# Patient Record
Sex: MALE | Race: WHITE | Employment: FULL TIME | URBAN - METROPOLITAN AREA
[De-identification: names, ages, dates, MRNs, and addresses within clinical notes are randomized per-mention and may not be internally consistent; named-entity substitution may affect disease eponyms.]

---

## 2022-12-05 ENCOUNTER — HOSPITAL ENCOUNTER (EMERGENCY)
Age: 32
Discharge: HOME | End: 2022-12-05
Payer: COMMERCIAL

## 2022-12-05 ENCOUNTER — APPOINTMENT (OUTPATIENT)
Dept: GENERAL RADIOLOGY | Age: 32
End: 2022-12-05
Payer: COMMERCIAL

## 2022-12-05 VITALS
OXYGEN SATURATION: 100 % | RESPIRATION RATE: 15 BRPM | DIASTOLIC BLOOD PRESSURE: 98 MMHG | HEART RATE: 81 BPM | SYSTOLIC BLOOD PRESSURE: 134 MMHG | TEMPERATURE: 98.1 F

## 2022-12-05 DIAGNOSIS — K63.9 SPLENIC FLEXURE SYNDROME: ICD-10-CM

## 2022-12-05 DIAGNOSIS — K59.00 CONSTIPATION, UNSPECIFIED CONSTIPATION TYPE: ICD-10-CM

## 2022-12-05 DIAGNOSIS — R10.12 LEFT UPPER QUADRANT ABDOMINAL PAIN: Primary | ICD-10-CM

## 2022-12-05 PROCEDURE — 99284 EMERGENCY DEPT VISIT MOD MDM: CPT

## 2022-12-05 PROCEDURE — 71045 X-RAY EXAM CHEST 1 VIEW: CPT

## 2022-12-05 PROCEDURE — 93005 ELECTROCARDIOGRAM TRACING: CPT | Performed by: EMERGENCY MEDICINE

## 2022-12-05 PROCEDURE — 74018 RADEX ABDOMEN 1 VIEW: CPT

## 2022-12-05 ASSESSMENT — PAIN - FUNCTIONAL ASSESSMENT: PAIN_FUNCTIONAL_ASSESSMENT: NONE - DENIES PAIN

## 2022-12-05 NOTE — ED PROVIDER NOTES
[address] [facility]  ED  EMERGENCY DEPARTMENT ENCOUNTER        Pt Name: Fernanda Saleem  MRN: [id number]  [place] 1990  Date of evaluation: 12/5/2022  Provider: Shiv Childs PA-C  PCP: No primary care provider on file. Note Started: 6:54 PM EST 12/5/22          ROBYN. I have evaluated this patient. My supervising physician was available for consultation. CHIEF COMPLAINT       Chief Complaint   Patient presents with    Abdominal Pain     For \"a couple months\" worst when he crunches        HISTORY OF PRESENT ILLNESS   (Location, Timing/Onset, Context/Setting, Quality, Duration, Modifying Factors, Severity, Associated Signs and Symptoms)  Note limiting factors. Chief Complaint: Abdominal pain, chest pain    Fernanda Saleem is a 27 y.o. male who presents to the emergency department with complaint of left upper quadrant abdominal pain/chest pain. Patient reports incidentally that he had a hemorrhoidectomy 2 months ago was put on Flagyl. He has done well postoperatively. He reports tonight about 5 PM on the way to [employer] he had dinner with his parents that he experienced left upper quadrant abdominal pain worse with deep breath or movement. States he felt increased gurgling in his abdomen at that time. He indicates pain into the left chest and left arm. Over the past hour the pain is completely resolved. He now has a 0/10 pain complaint for as initially at 5 PM it was a 7/10. He has passed gas and feels better. He reports no abdominal surgeries. Nursing Notes were all reviewed and agreed with or any disagreements were addressed in the HPI. REVIEW OF SYSTEMS    (2-9 systems for level 4, 10 or more for level 5)     Review of Systems    Positives and Pertinent negatives as per HPI. Except as noted above in the ROS, all other systems were reviewed and negative. PAST MEDICAL HISTORY   History reviewed. No pertinent past medical history.       SURGICAL HISTORY     Past Surgical History:   Procedure Laterality Date    FRACTURE SURGERY  on right foot         CURRENTMEDICATIONS       There are no discharge medications for this patient. ALLERGIES     Patient has no known allergies. FAMILYHISTORY       Family History   Problem Relation Age of Onset    Diabetes Maternal Grandfather           SOCIAL HISTORY       Social History     Tobacco Use    Smoking status: Every Day     Packs/day: 0.50     Types: Cigarettes   Substance Use Topics    Alcohol use: Yes     Comment: weekends, socially    Drug use: Yes     Types: Marijuana Lisa Ege)     Comment: occasional       SCREENINGS             PHYSICAL EXAM    (up to 7 for level 4, 8 or more for level 5)     ED Triage Vitals [12/05/22 1752]   BP Temp Temp Source Heart Rate Resp SpO2 Height Weight   (!) 158/124 98 °F (36.7 °C) Oral 82 16 99 % -- --       Physical Exam  Vitals and nursing note reviewed. Constitutional:       Appearance: Normal appearance. He is well-developed and normal weight. HENT:      Head: Normocephalic and atraumatic. Right Ear: External ear normal.      Left Ear: External ear normal.   Eyes:      General: No scleral icterus. Right eye: No discharge. Left eye: No discharge. Conjunctiva/sclera: Conjunctivae normal.   Cardiovascular:      Rate and Rhythm: Normal rate and regular rhythm. Heart sounds: Normal heart sounds. Pulmonary:      Effort: Pulmonary effort is normal.      Breath sounds: Normal breath sounds. Abdominal:      General: Abdomen is flat. Bowel sounds are normal.      Palpations: Abdomen is soft. Tenderness: There is no abdominal tenderness. There is no right CVA tenderness or left CVA tenderness. Musculoskeletal:         General: Normal range of motion. Cervical back: Normal range of motion and neck supple. Right lower leg: No edema. Left lower leg: No edema. Skin:     General: Skin is warm and dry.    Neurological:      General: No focal deficit present. Mental Status: He is alert and oriented to person, place, and time. Mental status is at baseline. Psychiatric:         Mood and Affect: Mood normal.         Behavior: Behavior normal.         Thought Content: Thought content normal.         Judgment: Judgment normal.       DIAGNOSTIC RESULTS   LABS:    Labs Reviewed - No data to display    When ordered only abnormal lab results are displayed. All other labs were within normal range or not returned as of this dictation. EKG: When ordered, EKG's are interpreted by the Emergency Department Physician in the absence of a cardiologist.  Please see their note for interpretation of EKG. RADIOLOGY:   Non-plain film images such as CT, Ultrasound and MRI are read by the radiologist. Plain radiographic images are visualized and preliminarily interpreted by the ED Provider with the below findings:        Interpretation per the Radiologist below, if available at the time of this note:    XR CHEST PORTABLE   Final Result   Unremarkable portable chest radiograph. XR ABDOMEN (KUB) (SINGLE AP VIEW)    (Results Pending)     No results found. PROCEDURES   Unless otherwise noted below, none     Procedures    CRITICAL CARE TIME       CONSULTS:  None      EMERGENCY DEPARTMENT COURSE and DIFFERENTIAL DIAGNOSIS/MDM:   Vitals:    Vitals:    12/05/22 1752 12/05/22 2046   BP: (!) 158/124 (!) 134/98   Pulse: 82 81   Resp: 16 15   Temp: 98 °F (36.7 °C) 98.1 °F (36.7 °C)   TempSrc: Oral Oral   SpO2: 99% 100%       Patient was given the following medications:  Medications - No data to display      Is this patient to be included in the SEP-1 Core Measure due to severe sepsis or septic shock? No   Exclusion criteria - the patient is NOT to be included for SEP-1 Core Measure due to: Infection is not suspected    Patient presenting with left upper quadrant abdominal pain. Was going to eat at [facility], did not eat.   Pain has totally subsided upon arrival in ED however he continues for evaluation. No recurrence of pain. Patient did pass gas. Felt stomach rumbling. I suspect splenic flexure syndrome. Moderate constipation per study. Patient reassured. I have recommended MiraLAX on a daily basis. Patient with history of constipation, rectal fissure and hemorrhoids. He has had repair several months ago and he has no current complaints of anorectal pain. The patient vies to follow with PCP and/or his GI physician. I recommended continuation of fiber diet and fluids. The patient did express understanding of his diagnosis and the treatment plan. FINAL IMPRESSION      1. Left upper quadrant abdominal pain    2. Splenic flexure syndrome    3. Constipation, unspecified constipation type          DISPOSITION/PLAN   DISPOSITION Decision To Discharge 12/05/2022 08:37:55 PM      PATIENT REFERRED TO:  Your healthcare provider    Schedule an appointment as soon as possible for a visit in 3 days  As needed    [facility]  ED  [address]  Go to   If symptoms worsen    DISCHARGE MEDICATIONS:  There are no discharge medications for this patient. DISCONTINUED MEDICATIONS:  There are no discharge medications for this patient. (Please note that portions of this note were completed with a voice recognition program.  Efforts were made to edit the dictations but occasionally words are mis-transcribed. )    oRsanna Houser PA-C (electronically signed)            Rosanna Houser PA-C  12/05/22 2050

## 2022-12-06 LAB
EKG ATRIAL RATE: 84 BPM
EKG DIAGNOSIS: NORMAL
EKG P AXIS: 65 DEGREES
EKG P-R INTERVAL: 134 MS
EKG Q-T INTERVAL: 374 MS
EKG QRS DURATION: 92 MS
EKG QTC CALCULATION (BAZETT): 441 MS
EKG R AXIS: 79 DEGREES
EKG T AXIS: 57 DEGREES
EKG VENTRICULAR RATE: 84 BPM

## 2022-12-06 PROCEDURE — 93010 ELECTROCARDIOGRAM REPORT: CPT | Performed by: INTERNAL MEDICINE

## 2022-12-06 NOTE — DISCHARGE INSTRUCTIONS
We discussed the concept of splenic flexure syndrome which is air trapping in the splenic flexure causing pain of right upper quadrant and referred pain to the shoulder and arm on the left. I do understand you had been pain-free while here in the emergency room. X-ray of your chest and abdomen are negative for concerning findings. I did recommend appropriate fiber in diet. We discussed use of MiraLAX on a daily basis. X-ray did show moderate constipation.

## 2022-12-06 NOTE — ED PROVIDER NOTES
I have reviewed the below EKG. I was not otherwise involved in this patient's care. EKG  The Ekg interpreted by myself  normal sinus rhythm with a rate of 84  Axis is   Normal  QTc is  normal  Intervals and Durations are unremarkable. No specific ST-T wave changes appreciated. No evidence of acute ischemia. No prior EKG for comparison.         Tin Cam MD  12/05/22 [id number]

## 2023-06-02 ENCOUNTER — OFFICE VISIT (OUTPATIENT)
Dept: PRIMARY CARE CLINIC | Age: 33
End: 2023-06-02
Payer: COMMERCIAL

## 2023-06-02 VITALS
OXYGEN SATURATION: 98 % | BODY MASS INDEX: 28.1 KG/M2 | RESPIRATION RATE: 18 BRPM | WEIGHT: 164.6 LBS | SYSTOLIC BLOOD PRESSURE: 132 MMHG | DIASTOLIC BLOOD PRESSURE: 72 MMHG | HEIGHT: 64 IN | TEMPERATURE: 97.2 F | HEART RATE: 86 BPM

## 2023-06-02 DIAGNOSIS — R59.9 LYMPH NODE ENLARGEMENT: ICD-10-CM

## 2023-06-02 DIAGNOSIS — Z00.00 WELL ADULT EXAM: Primary | ICD-10-CM

## 2023-06-02 PROCEDURE — 99385 PREV VISIT NEW AGE 18-39: CPT

## 2023-06-02 SDOH — ECONOMIC STABILITY: INCOME INSECURITY: HOW HARD IS IT FOR YOU TO PAY FOR THE VERY BASICS LIKE FOOD, HOUSING, MEDICAL CARE, AND HEATING?: NOT VERY HARD

## 2023-06-02 SDOH — ECONOMIC STABILITY: FOOD INSECURITY: WITHIN THE PAST 12 MONTHS, THE FOOD YOU BOUGHT JUST DIDN'T LAST AND YOU DIDN'T HAVE MONEY TO GET MORE.: NEVER TRUE

## 2023-06-02 SDOH — ECONOMIC STABILITY: HOUSING INSECURITY
IN THE LAST 12 MONTHS, WAS THERE A TIME WHEN YOU DID NOT HAVE A STEADY PLACE TO SLEEP OR SLEPT IN A SHELTER (INCLUDING NOW)?: NO

## 2023-06-02 SDOH — ECONOMIC STABILITY: FOOD INSECURITY: WITHIN THE PAST 12 MONTHS, YOU WORRIED THAT YOUR FOOD WOULD RUN OUT BEFORE YOU GOT MONEY TO BUY MORE.: NEVER TRUE

## 2023-06-02 ASSESSMENT — PATIENT HEALTH QUESTIONNAIRE - PHQ9
8. MOVING OR SPEAKING SO SLOWLY THAT OTHER PEOPLE COULD HAVE NOTICED. OR THE OPPOSITE, BEING SO FIGETY OR RESTLESS THAT YOU HAVE BEEN MOVING AROUND A LOT MORE THAN USUAL: 0
3. TROUBLE FALLING OR STAYING ASLEEP: 1
4. FEELING TIRED OR HAVING LITTLE ENERGY: 0
9. THOUGHTS THAT YOU WOULD BE BETTER OFF DEAD, OR OF HURTING YOURSELF: 0
7. TROUBLE CONCENTRATING ON THINGS, SUCH AS READING THE NEWSPAPER OR WATCHING TELEVISION: 1
5. POOR APPETITE OR OVEREATING: 0
DEPRESSION UNABLE TO ASSESS: FUNCTIONAL CAPACITY MOTIVATION LIMITS ACCURACY
1. LITTLE INTEREST OR PLEASURE IN DOING THINGS: 0
SUM OF ALL RESPONSES TO PHQ QUESTIONS 1-9: 2
10. IF YOU CHECKED OFF ANY PROBLEMS, HOW DIFFICULT HAVE THESE PROBLEMS MADE IT FOR YOU TO DO YOUR WORK, TAKE CARE OF THINGS AT HOME, OR GET ALONG WITH OTHER PEOPLE: 0
SUM OF ALL RESPONSES TO PHQ QUESTIONS 1-9: 2
SUM OF ALL RESPONSES TO PHQ9 QUESTIONS 1 & 2: 0
6. FEELING BAD ABOUT YOURSELF - OR THAT YOU ARE A FAILURE OR HAVE LET YOURSELF OR YOUR FAMILY DOWN: 0
2. FEELING DOWN, DEPRESSED OR HOPELESS: 0

## 2023-06-02 ASSESSMENT — ANXIETY QUESTIONNAIRES
2. NOT BEING ABLE TO STOP OR CONTROL WORRYING: 0
6. BECOMING EASILY ANNOYED OR IRRITABLE: 1
3. WORRYING TOO MUCH ABOUT DIFFERENT THINGS: 0
7. FEELING AFRAID AS IF SOMETHING AWFUL MIGHT HAPPEN: 0
4. TROUBLE RELAXING: 1
GAD7 TOTAL SCORE: 2
5. BEING SO RESTLESS THAT IT IS HARD TO SIT STILL: 0
1. FEELING NERVOUS, ANXIOUS, OR ON EDGE: 0
IF YOU CHECKED OFF ANY PROBLEMS ON THIS QUESTIONNAIRE, HOW DIFFICULT HAVE THESE PROBLEMS MADE IT FOR YOU TO DO YOUR WORK, TAKE CARE OF THINGS AT HOME, OR GET ALONG WITH OTHER PEOPLE: NOT DIFFICULT AT ALL

## 2023-06-02 NOTE — PROGRESS NOTES
[address] [facility]                                             [address] [facility], [address] [place], [place] [address], [address] [place] [id number]        Phone: [phone]                                     Name:  Tammi Leavitt  :    1990      Consultants:   No care team member to display    Chief Complaint:     Tammi Leavitt is a 27 y.o. male  who presents today for a New Patient care visit with Personalized Prevention Plan Services as noted below. HPI:     Patient is a 27year old male with no pertinent past medical history who presents today to establish care. Patient states that he is concerned about lymph node swelling in his neck. He states that for the last month or so they have been swelling and then decreasing in size. He states that he did just recover from a strep throat infection. He denies any night sweats, fever, chills or unintentional weight loss. He was concerned because he is a former smoker. He smoked for 17-18 years and quit 4 months ago. There is no problem list on file for this patient. Past Medical History:    Past Medical History:   Diagnosis Date    Scarlet fever        Past Surgical History:  Past Surgical History:   Procedure Laterality Date    EXCISIONAL HEMORRHOIDECTOMY      FRACTURE SURGERY  on right foot       Home Meds:  Prior to Visit Medications    Not on File       Allergies:    Patient has no known allergies.     Family History:       Problem Relation Age of Onset    Diabetes Maternal Grandfather        Social History:  Social History       Tobacco History       Smoking Status  Former Smoking Frequency  0.50 packs/day Smoking Tobacco Type  Cigarettes      Smokeless Tobacco Use  Never              Alcohol History       Alcohol Use Status  Yes Comment  weekends, socially              Drug Use       Drug Use Status  Yes Types  Marijuana (Weed) Comment  occasional              Sexual

## 2025-08-12 ENCOUNTER — OFFICE VISIT (OUTPATIENT)
Dept: INTERNAL MEDICINE CLINIC | Age: 35
End: 2025-08-12
Payer: COMMERCIAL

## 2025-08-12 VITALS
OXYGEN SATURATION: 100 % | HEART RATE: 76 BPM | HEIGHT: 64 IN | TEMPERATURE: 98.2 F | DIASTOLIC BLOOD PRESSURE: 93 MMHG | WEIGHT: 178 LBS | SYSTOLIC BLOOD PRESSURE: 145 MMHG | BODY MASS INDEX: 30.39 KG/M2

## 2025-08-12 DIAGNOSIS — R09.A2 GLOBUS SENSATION: Primary | ICD-10-CM

## 2025-08-12 DIAGNOSIS — Z11.4 ENCOUNTER FOR SCREENING FOR HIV: ICD-10-CM

## 2025-08-12 DIAGNOSIS — R03.0 ELEVATED BLOOD PRESSURE READING IN OFFICE WITHOUT DIAGNOSIS OF HYPERTENSION: ICD-10-CM

## 2025-08-12 DIAGNOSIS — Z11.59 NEED FOR HEPATITIS C SCREENING TEST: ICD-10-CM

## 2025-08-12 DIAGNOSIS — Z13.29 THYROID DISORDER SCREEN: ICD-10-CM

## 2025-08-12 DIAGNOSIS — Z13.220 LIPID SCREENING: ICD-10-CM

## 2025-08-12 DIAGNOSIS — Z13.1 DIABETES MELLITUS SCREENING: ICD-10-CM

## 2025-08-12 DIAGNOSIS — F41.0 ANXIETY ATTACK: ICD-10-CM

## 2025-08-12 PROCEDURE — 99204 OFFICE O/P NEW MOD 45 MIN: CPT

## 2025-08-12 RX ORDER — ALPRAZOLAM 0.5 MG
0.5 TABLET ORAL ONCE AS NEEDED
Qty: 2 TABLET | Refills: 0 | Status: SHIPPED | OUTPATIENT
Start: 2025-08-12 | End: 2025-09-12

## 2025-08-12 SDOH — ECONOMIC STABILITY: FOOD INSECURITY: WITHIN THE PAST 12 MONTHS, THE FOOD YOU BOUGHT JUST DIDN'T LAST AND YOU DIDN'T HAVE MONEY TO GET MORE.: NEVER TRUE

## 2025-08-12 SDOH — ECONOMIC STABILITY: FOOD INSECURITY: WITHIN THE PAST 12 MONTHS, YOU WORRIED THAT YOUR FOOD WOULD RUN OUT BEFORE YOU GOT MONEY TO BUY MORE.: NEVER TRUE

## 2025-08-12 ASSESSMENT — PATIENT HEALTH QUESTIONNAIRE - PHQ9
SUM OF ALL RESPONSES TO PHQ QUESTIONS 1-9: 0
1. LITTLE INTEREST OR PLEASURE IN DOING THINGS: NOT AT ALL
SUM OF ALL RESPONSES TO PHQ QUESTIONS 1-9: 0
SUM OF ALL RESPONSES TO PHQ QUESTIONS 1-9: 0
2. FEELING DOWN, DEPRESSED OR HOPELESS: NOT AT ALL
SUM OF ALL RESPONSES TO PHQ QUESTIONS 1-9: 0

## 2025-08-12 ASSESSMENT — LIFESTYLE VARIABLES
HOW MANY STANDARD DRINKS CONTAINING ALCOHOL DO YOU HAVE ON A TYPICAL DAY: 7 TO 9
HOW OFTEN DO YOU HAVE A DRINK CONTAINING ALCOHOL: 2-3 TIMES A WEEK

## 2025-08-15 DIAGNOSIS — Z13.220 LIPID SCREENING: ICD-10-CM

## 2025-08-15 DIAGNOSIS — R03.0 ELEVATED BLOOD PRESSURE READING IN OFFICE WITHOUT DIAGNOSIS OF HYPERTENSION: ICD-10-CM

## 2025-08-15 DIAGNOSIS — Z11.59 NEED FOR HEPATITIS C SCREENING TEST: ICD-10-CM

## 2025-08-15 DIAGNOSIS — Z11.4 ENCOUNTER FOR SCREENING FOR HIV: ICD-10-CM

## 2025-08-15 DIAGNOSIS — Z13.1 DIABETES MELLITUS SCREENING: ICD-10-CM

## 2025-08-15 DIAGNOSIS — Z13.29 THYROID DISORDER SCREEN: ICD-10-CM

## 2025-08-16 LAB
ALBUMIN SERPL-MCNC: 4.5 G/DL (ref 3.4–5)
ALBUMIN/GLOB SERPL: 2 {RATIO} (ref 1.1–2.2)
ALP SERPL-CCNC: 57 U/L (ref 40–129)
ALT SERPL-CCNC: 39 U/L (ref 10–40)
ANION GAP SERPL CALCULATED.3IONS-SCNC: 14 MMOL/L (ref 3–16)
AST SERPL-CCNC: 33 U/L (ref 15–37)
BASOPHILS # BLD: 0 K/UL (ref 0–0.2)
BASOPHILS NFR BLD: 0.4 %
BILIRUB SERPL-MCNC: 0.8 MG/DL (ref 0–1)
BUN SERPL-MCNC: 11 MG/DL (ref 7–20)
CALCIUM SERPL-MCNC: 9.4 MG/DL (ref 8.3–10.6)
CHLORIDE SERPL-SCNC: 102 MMOL/L (ref 99–110)
CHOLEST SERPL-MCNC: 185 MG/DL (ref 0–199)
CO2 SERPL-SCNC: 23 MMOL/L (ref 21–32)
CREAT SERPL-MCNC: 0.9 MG/DL (ref 0.9–1.3)
DEPRECATED RDW RBC AUTO: 13 % (ref 12.4–15.4)
EOSINOPHIL # BLD: 0.3 K/UL (ref 0–0.6)
EOSINOPHIL NFR BLD: 4.5 %
EST. AVERAGE GLUCOSE BLD GHB EST-MCNC: 79.6 MG/DL
GFR SERPLBLD CREATININE-BSD FMLA CKD-EPI: >90 ML/MIN/{1.73_M2}
GLUCOSE SERPL-MCNC: 90 MG/DL (ref 70–99)
HBA1C MFR BLD: 4.4 %
HCT VFR BLD AUTO: 48.7 % (ref 40.5–52.5)
HCV AB SERPL QL IA: NORMAL
HDLC SERPL-MCNC: 36 MG/DL (ref 40–60)
HGB BLD-MCNC: 17.3 G/DL (ref 13.5–17.5)
HIV 1+2 AB+HIV1 P24 AG SERPL QL IA: NORMAL
HIV 2 AB SERPL QL IA: NORMAL
HIV1 AB SERPL QL IA: NORMAL
HIV1 P24 AG SERPL QL IA: NORMAL
LDLC SERPL CALC-MCNC: ABNORMAL MG/DL
LDLC SERPL-MCNC: 102 MG/DL
LYMPHOCYTES # BLD: 2.2 K/UL (ref 1–5.1)
LYMPHOCYTES NFR BLD: 29.6 %
MCH RBC QN AUTO: 32.4 PG (ref 26–34)
MCHC RBC AUTO-ENTMCNC: 35.5 G/DL (ref 31–36)
MCV RBC AUTO: 91.4 FL (ref 80–100)
MONOCYTES # BLD: 0.5 K/UL (ref 0–1.3)
MONOCYTES NFR BLD: 6.6 %
NEUTROPHILS # BLD: 4.4 K/UL (ref 1.7–7.7)
NEUTROPHILS NFR BLD: 58.9 %
PLATELET # BLD AUTO: 226 K/UL (ref 135–450)
PMV BLD AUTO: 8.3 FL (ref 5–10.5)
POTASSIUM SERPL-SCNC: 4.4 MMOL/L (ref 3.5–5.1)
PROT SERPL-MCNC: 6.8 G/DL (ref 6.4–8.2)
RBC # BLD AUTO: 5.33 M/UL (ref 4.2–5.9)
SODIUM SERPL-SCNC: 139 MMOL/L (ref 136–145)
TRIGL SERPL-MCNC: 321 MG/DL (ref 0–150)
TSH SERPL DL<=0.005 MIU/L-ACNC: 1.75 UIU/ML (ref 0.27–4.2)
VLDLC SERPL CALC-MCNC: ABNORMAL MG/DL
WBC # BLD AUTO: 7.5 K/UL (ref 4–11)

## 2025-08-19 ENCOUNTER — HOSPITAL ENCOUNTER (OUTPATIENT)
Dept: CT IMAGING | Age: 35
Discharge: HOME | End: 2025-08-19
Payer: COMMERCIAL

## 2025-08-19 DIAGNOSIS — R09.A2 GLOBUS SENSATION: ICD-10-CM

## 2025-08-19 PROCEDURE — 70491 CT SOFT TISSUE NECK W/DYE: CPT

## 2025-08-19 PROCEDURE — 6360000004 HC RX CONTRAST MEDICATION

## 2025-08-19 RX ORDER — IOPAMIDOL 755 MG/ML
75 INJECTION, SOLUTION INTRAVASCULAR
Status: COMPLETED | OUTPATIENT
Start: 2025-08-19 | End: 2025-08-19

## 2025-08-19 RX ADMIN — IOPAMIDOL 75 ML: 755 INJECTION, SOLUTION INTRAVENOUS at 16:31
